# Patient Record
Sex: MALE | Race: BLACK OR AFRICAN AMERICAN | Employment: FULL TIME | URBAN - METROPOLITAN AREA
[De-identification: names, ages, dates, MRNs, and addresses within clinical notes are randomized per-mention and may not be internally consistent; named-entity substitution may affect disease eponyms.]

---

## 2017-01-30 ENCOUNTER — APPOINTMENT (OUTPATIENT)
Dept: CT IMAGING | Age: 52
End: 2017-01-30
Attending: NURSE PRACTITIONER
Payer: COMMERCIAL

## 2017-01-30 ENCOUNTER — HOSPITAL ENCOUNTER (OUTPATIENT)
Age: 52
Discharge: HOME | End: 2017-01-30
Payer: COMMERCIAL

## 2017-01-30 VITALS
SYSTOLIC BLOOD PRESSURE: 136 MMHG | HEART RATE: 68 BPM | OXYGEN SATURATION: 96 % | DIASTOLIC BLOOD PRESSURE: 75 MMHG | RESPIRATION RATE: 16 BRPM | TEMPERATURE: 99 F

## 2017-01-30 DIAGNOSIS — N40.0 ENLARGED PROSTATE: ICD-10-CM

## 2017-01-30 DIAGNOSIS — K76.89 HEPATIC CYST: ICD-10-CM

## 2017-01-30 DIAGNOSIS — E87.6 HYPOKALEMIA: Primary | ICD-10-CM

## 2017-01-30 DIAGNOSIS — K52.9 GASTROENTERITIS: ICD-10-CM

## 2017-01-30 LAB
#LYMPHOCYTE IC: 3.2 X10ˆ3/UL (ref 0.9–3.2)
#MXD IC: 0.9 X10ˆ3/UL (ref 0.1–1)
#NEUTROPHIL IC: 5.9 X10ˆ3/UL (ref 1.3–6.7)
CREAT SERPL-MCNC: 1.3 MG/DL (ref 0.7–1.2)
GLUCOSE BLD-MCNC: 99 MG/DL (ref 65–99)
HCT IC: 38.2 % (ref 37–54)
HGB IC: 13 G/DL (ref 13–17)
ISTAT BLOOD GAS TCO2: 25 MMOL/L (ref 22–32)
ISTAT BUN: 8 MG/DL (ref 8–20)
ISTAT CHLORIDE: 94 MMOL/L (ref 101–111)
ISTAT HEMATOCRIT: 40 % (ref 37–54)
ISTAT IONIZED CALCIUM: 1.18 MMOL/L (ref 1.12–1.32)
ISTAT POTASSIUM: 3.3 MMOL/L (ref 3.6–5.1)
ISTAT SODIUM: 135 MMOL/L (ref 136–144)
MCH IC: 30.1 PG (ref 27–33.2)
MCHC IC: 34 G/DL (ref 31–37)
MCV IC: 88.4 FL (ref 80–99)
PLT IC: 475 X10ˆ3/UL (ref 150–450)
RBC IC: 4.32 X10ˆ6/UL (ref 4.3–5.7)
WBC IC: 10 X10ˆ3/UL (ref 4–13)

## 2017-01-30 PROCEDURE — 99204 OFFICE O/P NEW MOD 45 MIN: CPT

## 2017-01-30 PROCEDURE — 74177 CT ABD & PELVIS W/CONTRAST: CPT

## 2017-01-30 PROCEDURE — 81002 URINALYSIS NONAUTO W/O SCOPE: CPT | Performed by: NURSE PRACTITIONER

## 2017-01-30 PROCEDURE — 96360 HYDRATION IV INFUSION INIT: CPT

## 2017-01-30 PROCEDURE — 85025 COMPLETE CBC W/AUTO DIFF WBC: CPT | Performed by: NURSE PRACTITIONER

## 2017-01-30 PROCEDURE — 80047 BASIC METABLC PNL IONIZED CA: CPT

## 2017-01-30 PROCEDURE — 99205 OFFICE O/P NEW HI 60 MIN: CPT

## 2017-01-30 RX ORDER — POTASSIUM CHLORIDE 20 MEQ/1
20 TABLET, EXTENDED RELEASE ORAL ONCE
Status: COMPLETED | OUTPATIENT
Start: 2017-01-30 | End: 2017-01-30

## 2017-01-30 RX ORDER — ONDANSETRON 4 MG/1
4 TABLET, ORALLY DISINTEGRATING ORAL EVERY 4 HOURS PRN
Qty: 10 TABLET | Refills: 0 | Status: SHIPPED | OUTPATIENT
Start: 2017-01-30 | End: 2020-02-10

## 2017-01-30 RX ORDER — SODIUM CHLORIDE 9 MG/ML
1000 INJECTION, SOLUTION INTRAVENOUS ONCE
Status: COMPLETED | OUTPATIENT
Start: 2017-01-30 | End: 2017-01-30

## 2017-01-30 RX ORDER — AMLODIPINE BESYLATE 10 MG/1
10 TABLET ORAL DAILY
COMMUNITY

## 2017-01-30 RX ORDER — POTASSIUM CHLORIDE 750 MG/1
10 TABLET, EXTENDED RELEASE ORAL 2 TIMES DAILY
Qty: 30 TABLET | Refills: 0 | Status: SHIPPED | OUTPATIENT
Start: 2017-01-30 | End: 2020-02-10

## 2017-01-30 RX ORDER — ENALAPRIL MALEATE 10 MG/1
10 TABLET ORAL DAILY
COMMUNITY

## 2017-01-30 RX ORDER — TERAZOSIN 2 MG/1
2 CAPSULE ORAL NIGHTLY
COMMUNITY

## 2017-01-30 RX ORDER — FLUTICASONE PROPIONATE 50 MCG
SPRAY, SUSPENSION (ML) NASAL DAILY
COMMUNITY
End: 2019-12-18

## 2017-01-30 NOTE — ED NOTES
Pt back from CT with Leanne Avalos given without complications, Changed back into clothes from home, IVF infusing, denies any needs, call light within reach, pain 4/10.

## 2017-01-30 NOTE — ED INITIAL ASSESSMENT (HPI)
X 1.6 WKS Pt c/o nausea, and diarrhea (3-4 times a day \"everytime I eat). Denies vomiting and fevers. x3 days Pt c/o decreased appitite. Pt does report taking Cephalaxin for \"vericose veins\".   Pt has it for PRN issues and has taken it for the past 3

## 2017-01-31 NOTE — ED PROVIDER NOTES
Patient Seen in: [address] [facility]    History   Patient presents with:  Nausea  Diarrhea    Stated Complaint: SINUS PAIN    HPI  14-year-old male who presents to the IC with complaints of nausea and diarrhea for the past 3 weeks.   Patient s Review of Systems   Constitutional: Negative for fever and chills. HENT: Negative. Eyes: Negative. Respiratory: Negative. Cardiovascular: Negative. Gastrointestinal: Positive for nausea and diarrhea.  Negative for abdominal pain, con hepatosplenomegaly. There is no tenderness. There is no rigidity, no rebound, no guarding, no CVA tenderness, no tenderness at McBurney's point and negative Pearson's sign. Neurological: He is alert and oriented to person, place, and time.    Skin: Skin is enhancing or suspicious hepatic lesions are noted. BILIARY:  Normal.  No visible dilatation or calcification. PANCREAS:  Normal.  No lesion, fluid collection, ductal dilatation, or atrophy. SPLEEN:  Normal.  No enlargement or focal lesion.   KIDNEYS:  Nor cyst    Disposition:  Discharge    Follow-up:  Your family MD    In 1 week  As needed, If symptoms worsen      Medications Prescribed:  Discharge Medication List as of 1/30/2017  3:48 PM    START taking these medications    Potassium Chloride ER 10 MEQ Ora

## 2017-02-07 ENCOUNTER — LAB ENCOUNTER (OUTPATIENT)
Dept: LAB | Age: 52
End: 2017-02-07
Attending: FAMILY MEDICINE
Payer: COMMERCIAL

## 2017-02-07 DIAGNOSIS — R19.7 DIARRHEA: Primary | ICD-10-CM

## 2017-02-07 PROCEDURE — 87046 STOOL CULTR AEROBIC BACT EA: CPT

## 2017-02-07 PROCEDURE — 87269 GIARDIA AG IF: CPT

## 2017-02-07 PROCEDURE — 87427 SHIGA-LIKE TOXIN AG IA: CPT

## 2017-02-07 PROCEDURE — 87015 SPECIMEN INFECT AGNT CONCNTJ: CPT

## 2017-02-07 PROCEDURE — 87045 FECES CULTURE AEROBIC BACT: CPT

## 2017-02-07 PROCEDURE — 87209 SMEAR COMPLEX STAIN: CPT

## 2017-02-07 PROCEDURE — 87493 C DIFF AMPLIFIED PROBE: CPT

## 2017-02-07 PROCEDURE — 87177 OVA AND PARASITES SMEARS: CPT

## 2017-02-10 LAB
OVA AND PARASITE, TRICHROME STAIN: NEGATIVE
OVA AND PARASITE, WET MOUNT: NEGATIVE

## 2017-07-14 ENCOUNTER — CHARTING TRANS (OUTPATIENT)
Dept: OTHER | Age: 52
End: 2017-07-14

## 2017-07-20 ENCOUNTER — CHARTING TRANS (OUTPATIENT)
Dept: OTHER | Age: 52
End: 2017-07-20

## 2017-07-21 ENCOUNTER — CHARTING TRANS (OUTPATIENT)
Dept: OTHER | Age: 52
End: 2017-07-21

## 2017-08-03 ENCOUNTER — CHARTING TRANS (OUTPATIENT)
Dept: OTHER | Age: 52
End: 2017-08-03

## 2017-09-07 ENCOUNTER — CHARTING TRANS (OUTPATIENT)
Dept: OTHER | Age: 52
End: 2017-09-07

## 2017-12-08 ENCOUNTER — CHARTING TRANS (OUTPATIENT)
Dept: OTHER | Age: 52
End: 2017-12-08

## 2017-12-14 ENCOUNTER — CHARTING TRANS (OUTPATIENT)
Dept: OTHER | Age: 52
End: 2017-12-14

## 2018-01-10 ENCOUNTER — CHARTING TRANS (OUTPATIENT)
Dept: OTHER | Age: 53
End: 2018-01-10

## 2018-01-11 ENCOUNTER — CHARTING TRANS (OUTPATIENT)
Dept: OTHER | Age: 53
End: 2018-01-11

## 2018-01-19 ENCOUNTER — CHARTING TRANS (OUTPATIENT)
Dept: OTHER | Age: 53
End: 2018-01-19

## 2018-01-25 ENCOUNTER — CHARTING TRANS (OUTPATIENT)
Dept: OTHER | Age: 53
End: 2018-01-25

## 2018-02-20 ENCOUNTER — CHARTING TRANS (OUTPATIENT)
Dept: OTHER | Age: 53
End: 2018-02-20

## 2018-03-01 ENCOUNTER — CHARTING TRANS (OUTPATIENT)
Dept: OTHER | Age: 53
End: 2018-03-01

## 2018-04-06 ENCOUNTER — CHARTING TRANS (OUTPATIENT)
Dept: OTHER | Age: 53
End: 2018-04-06

## 2018-08-02 ENCOUNTER — CHARTING TRANS (OUTPATIENT)
Dept: OTHER | Age: 53
End: 2018-08-02

## 2018-11-01 ENCOUNTER — CHARTING TRANS (OUTPATIENT)
Dept: OTHER | Age: 53
End: 2018-11-01

## 2018-12-22 ENCOUNTER — HOSPITAL ENCOUNTER (OUTPATIENT)
Dept: GENERAL RADIOLOGY | Age: 53
Discharge: HOME | End: 2018-12-22
Attending: CHIROPRACTOR
Payer: COMMERCIAL

## 2018-12-22 DIAGNOSIS — G35 HX OF MULTIPLE SCLEROSIS (HCC): ICD-10-CM

## 2018-12-22 DIAGNOSIS — S73.192A OTHER SPRAIN OF LEFT HIP, INITIAL ENCOUNTER: ICD-10-CM

## 2018-12-22 PROCEDURE — 73502 X-RAY EXAM HIP UNI 2-3 VIEWS: CPT | Performed by: CHIROPRACTOR

## 2019-01-10 ENCOUNTER — OFFICE VISIT (OUTPATIENT)
Dept: AUDIOLOGY | Age: 54
End: 2019-01-10

## 2019-01-10 DIAGNOSIS — H90.0 CONDUCTIVE HEARING LOSS, BILATERAL: Primary | ICD-10-CM

## 2019-01-10 PROCEDURE — X1094 NO CHARGE VISIT: HCPCS | Performed by: AUDIOLOGIST

## 2019-02-02 ENCOUNTER — APPOINTMENT (OUTPATIENT)
Dept: GENERAL RADIOLOGY | Age: 54
End: 2019-02-02
Attending: FAMILY MEDICINE
Payer: COMMERCIAL

## 2019-02-02 ENCOUNTER — HOSPITAL ENCOUNTER (OUTPATIENT)
Age: 54
Discharge: HOME | End: 2019-02-02
Attending: FAMILY MEDICINE
Payer: COMMERCIAL

## 2019-02-02 VITALS
RESPIRATION RATE: 18 BRPM | TEMPERATURE: 98 F | SYSTOLIC BLOOD PRESSURE: 147 MMHG | DIASTOLIC BLOOD PRESSURE: 82 MMHG | BODY MASS INDEX: 33.04 KG/M2 | OXYGEN SATURATION: 97 % | WEIGHT: 236 LBS | HEIGHT: 71 IN | HEART RATE: 68 BPM

## 2019-02-02 DIAGNOSIS — S20.212A RIB CONTUSION, LEFT, INITIAL ENCOUNTER: Primary | ICD-10-CM

## 2019-02-02 PROCEDURE — 71101 X-RAY EXAM UNILAT RIBS/CHEST: CPT | Performed by: FAMILY MEDICINE

## 2019-02-02 PROCEDURE — 99213 OFFICE O/P EST LOW 20 MIN: CPT

## 2019-02-02 RX ORDER — TADALAFIL 5 MG/1
5 TABLET ORAL DAILY
COMMUNITY

## 2019-02-02 RX ORDER — PAROXETINE HYDROCHLORIDE 25 MG/1
25 TABLET, FILM COATED, EXTENDED RELEASE ORAL
Refills: 0 | COMMUNITY
Start: 2018-11-09

## 2019-02-02 NOTE — ED INITIAL ASSESSMENT (HPI)
Patient c/o pain to left rib pain for about 10- 14 days. Unsure of cause, but thinks it may be related to bumping into a rail on an elevator when getting off and fell into it. Pain has not improved.  Patient has been taking 800mg of ibuprofen that does help
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
## 2019-02-21 RX ORDER — AMLODIPINE BESYLATE 10 MG/1
TABLET ORAL
COMMUNITY

## 2019-02-21 RX ORDER — FLUTICASONE PROPIONATE 50 MCG
SPRAY, SUSPENSION (ML) NASAL
COMMUNITY

## 2019-02-21 RX ORDER — PAROXETINE HYDROCHLORIDE HEMIHYDRATE 25 MG/1
TABLET, FILM COATED, EXTENDED RELEASE ORAL
COMMUNITY

## 2019-02-21 RX ORDER — ENALAPRIL MALEATE AND HYDROCHLOROTHIAZIDE 10; 25 MG/1; MG/1
TABLET ORAL
COMMUNITY

## 2019-04-26 ENCOUNTER — OFFICE VISIT (OUTPATIENT)
Dept: AUDIOLOGY | Age: 54
End: 2019-04-26

## 2019-04-26 DIAGNOSIS — H90.0 CONDUCTIVE HEARING LOSS, BILATERAL: Primary | ICD-10-CM

## 2019-04-26 PROCEDURE — X1094 NO CHARGE VISIT: HCPCS | Performed by: AUDIOLOGIST

## 2019-06-21 ENCOUNTER — HOSPITAL ENCOUNTER (OUTPATIENT)
Age: 54
Discharge: HOME | End: 2019-06-21
Attending: FAMILY MEDICINE
Payer: COMMERCIAL

## 2019-06-21 VITALS
RESPIRATION RATE: 16 BRPM | WEIGHT: 260 LBS | TEMPERATURE: 98 F | SYSTOLIC BLOOD PRESSURE: 134 MMHG | DIASTOLIC BLOOD PRESSURE: 73 MMHG | HEART RATE: 75 BPM | OXYGEN SATURATION: 97 % | BODY MASS INDEX: 36 KG/M2

## 2019-06-21 DIAGNOSIS — J01.10 ACUTE FRONTAL SINUSITIS, RECURRENCE NOT SPECIFIED: Primary | ICD-10-CM

## 2019-06-21 PROCEDURE — 99213 OFFICE O/P EST LOW 20 MIN: CPT

## 2019-06-21 PROCEDURE — 99214 OFFICE O/P EST MOD 30 MIN: CPT

## 2019-06-21 RX ORDER — AMOXICILLIN AND CLAVULANATE POTASSIUM 875; 125 MG/1; MG/1
1 TABLET, FILM COATED ORAL 2 TIMES DAILY
Qty: 28 TABLET | Refills: 0 | Status: SHIPPED | OUTPATIENT
Start: 2019-06-21 | End: 2019-07-05

## 2019-06-21 RX ORDER — METHYLPREDNISOLONE 4 MG/1
TABLET ORAL
Qty: 21 TABLET | Refills: 0 | Status: SHIPPED | OUTPATIENT
Start: 2019-06-21 | End: 2019-12-18

## 2019-06-21 NOTE — ED PROVIDER NOTES
Patient Seen in: [address] [facility]    History   Patient presents with:  Sinus Problem  Cough/URI    Stated Complaint: coughing sinus drainage fatigue     HPI    *78-year-old male presents to the immediate care today with chief complaint of src Temporal   SpO2 97 %   O2 Device None (Room air)       Current:/73   Pulse 75   Temp 97.7 °F (36.5 °C) (Temporal)   Resp 16   Wt 117.9 kg   SpO2 97%   BMI 36.26 kg/m²         Physical Exam    General appearance: alert, appears stated age and coop [id number]  [phone]    In 1 week  For re-check        Medications Prescribed:  Discharge Medication List as of 6/21/2019  3:33 PM    START taking these medications    Amoxicillin-Pot Clavulanate (AUGMENTIN) 875-125 MG Oral Tab  Take 1 tablet by mouth 2 (tw
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
## 2019-09-20 ENCOUNTER — OFFICE VISIT (OUTPATIENT)
Dept: AUDIOLOGY | Age: 54
End: 2019-09-20

## 2019-09-20 DIAGNOSIS — H90.3 SENSORINEURAL HEARING LOSS (SNHL) OF BOTH EARS: Primary | ICD-10-CM

## 2019-09-20 PROCEDURE — X1094 NO CHARGE VISIT: HCPCS | Performed by: AUDIOLOGIST

## 2019-12-18 ENCOUNTER — APPOINTMENT (OUTPATIENT)
Dept: GENERAL RADIOLOGY | Age: 54
End: 2019-12-18
Attending: NURSE PRACTITIONER
Payer: COMMERCIAL

## 2019-12-18 ENCOUNTER — HOSPITAL ENCOUNTER (OUTPATIENT)
Age: 54
Discharge: HOME | End: 2019-12-18
Payer: COMMERCIAL

## 2019-12-18 VITALS
OXYGEN SATURATION: 97 % | DIASTOLIC BLOOD PRESSURE: 77 MMHG | SYSTOLIC BLOOD PRESSURE: 144 MMHG | TEMPERATURE: 98 F | HEART RATE: 82 BPM | WEIGHT: 250 LBS | BODY MASS INDEX: 35 KG/M2 | RESPIRATION RATE: 16 BRPM

## 2019-12-18 DIAGNOSIS — J32.9 CHRONIC SINUSITIS, UNSPECIFIED LOCATION: ICD-10-CM

## 2019-12-18 DIAGNOSIS — J40 BRONCHITIS: Primary | ICD-10-CM

## 2019-12-18 PROCEDURE — 71046 X-RAY EXAM CHEST 2 VIEWS: CPT | Performed by: NURSE PRACTITIONER

## 2019-12-18 PROCEDURE — 99214 OFFICE O/P EST MOD 30 MIN: CPT

## 2019-12-18 PROCEDURE — 94640 AIRWAY INHALATION TREATMENT: CPT

## 2019-12-18 RX ORDER — AMOXICILLIN AND CLAVULANATE POTASSIUM 875; 125 MG/1; MG/1
1 TABLET, FILM COATED ORAL 2 TIMES DAILY
Qty: 14 TABLET | Refills: 0 | Status: SHIPPED | OUTPATIENT
Start: 2019-12-18 | End: 2019-12-25

## 2019-12-18 RX ORDER — GUAIFENESIN AND CODEINE PHOSPHATE 100; 10 MG/5ML; MG/5ML
5 SOLUTION ORAL EVERY 8 HOURS PRN
Qty: 118 ML | Refills: 0 | Status: SHIPPED | OUTPATIENT
Start: 2019-12-18 | End: 2020-01-01

## 2019-12-18 RX ORDER — ZOLPIDEM TARTRATE 10 MG/1
10 TABLET ORAL NIGHTLY PRN
COMMUNITY

## 2019-12-18 RX ORDER — BENZONATATE 100 MG/1
100 CAPSULE ORAL 3 TIMES DAILY PRN
Qty: 30 CAPSULE | Refills: 0 | Status: SHIPPED | OUTPATIENT
Start: 2019-12-18 | End: 2020-01-17

## 2019-12-18 RX ORDER — ALBUTEROL SULFATE 90 UG/1
2 AEROSOL, METERED RESPIRATORY (INHALATION) EVERY 4 HOURS PRN
Qty: 1 INHALER | Refills: 0 | Status: SHIPPED | OUTPATIENT
Start: 2019-12-18 | End: 2020-01-17

## 2019-12-18 RX ORDER — IPRATROPIUM BROMIDE AND ALBUTEROL SULFATE 2.5; .5 MG/3ML; MG/3ML
3 SOLUTION RESPIRATORY (INHALATION) ONCE
Status: COMPLETED | OUTPATIENT
Start: 2019-12-18 | End: 2019-12-18

## 2019-12-18 RX ORDER — FLUTICASONE PROPIONATE 50 MCG
2 SPRAY, SUSPENSION (ML) NASAL DAILY
Qty: 16 G | Refills: 0 | Status: SHIPPED | OUTPATIENT
Start: 2019-12-18 | End: 2020-01-17
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
## 2020-02-10 ENCOUNTER — APPOINTMENT (OUTPATIENT)
Dept: GENERAL RADIOLOGY | Age: 55
End: 2020-02-10
Attending: EMERGENCY MEDICINE
Payer: COMMERCIAL

## 2020-02-10 ENCOUNTER — HOSPITAL ENCOUNTER (OUTPATIENT)
Age: 55
Discharge: HOME | End: 2020-02-10
Attending: EMERGENCY MEDICINE
Payer: COMMERCIAL

## 2020-02-10 VITALS
OXYGEN SATURATION: 98 % | TEMPERATURE: 98 F | SYSTOLIC BLOOD PRESSURE: 159 MMHG | RESPIRATION RATE: 16 BRPM | HEART RATE: 79 BPM | DIASTOLIC BLOOD PRESSURE: 88 MMHG

## 2020-02-10 DIAGNOSIS — S16.1XXA STRAIN OF NECK MUSCLE, INITIAL ENCOUNTER: Primary | ICD-10-CM

## 2020-02-10 PROCEDURE — 72050 X-RAY EXAM NECK SPINE 4/5VWS: CPT | Performed by: EMERGENCY MEDICINE

## 2020-02-10 PROCEDURE — 99213 OFFICE O/P EST LOW 20 MIN: CPT

## 2020-02-10 PROCEDURE — 99214 OFFICE O/P EST MOD 30 MIN: CPT

## 2020-02-10 RX ORDER — IBUPROFEN 800 MG/1
800 TABLET ORAL EVERY 6 HOURS PRN
COMMUNITY

## 2020-02-10 RX ORDER — CYCLOBENZAPRINE HCL 10 MG
10 TABLET ORAL 3 TIMES DAILY PRN
Qty: 20 TABLET | Refills: 0 | Status: SHIPPED | OUTPATIENT
Start: 2020-02-10 | End: 2020-02-10

## 2020-02-10 RX ORDER — CYCLOBENZAPRINE HCL 10 MG
10 TABLET ORAL 3 TIMES DAILY PRN
Qty: 20 TABLET | Refills: 0 | Status: SHIPPED | OUTPATIENT
Start: 2020-02-10 | End: 2020-02-17
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
## 2020-05-21 ENCOUNTER — TELEPHONE (OUTPATIENT)
Dept: AUDIOLOGY | Age: 55
End: 2020-05-21

## 2020-05-21 ENCOUNTER — APPOINTMENT (OUTPATIENT)
Dept: AUDIOLOGY | Age: 55
End: 2020-05-21

## 2020-06-05 ENCOUNTER — APPOINTMENT (OUTPATIENT)
Dept: AUDIOLOGY | Age: 55
End: 2020-06-05

## 2020-06-17 ENCOUNTER — TELEPHONE (OUTPATIENT)
Dept: AUDIOLOGY | Age: 55
End: 2020-06-17

## 2020-06-18 ENCOUNTER — OFFICE VISIT (OUTPATIENT)
Dept: AUDIOLOGY | Age: 55
End: 2020-06-18

## 2020-06-18 DIAGNOSIS — H90.0 CONDUCTIVE HEARING LOSS, BILATERAL: Primary | ICD-10-CM

## 2020-06-18 PROCEDURE — X1094 NO CHARGE VISIT: HCPCS | Performed by: AUDIOLOGIST

## 2020-06-23 ENCOUNTER — TELEPHONE (OUTPATIENT)
Dept: AUDIOLOGY | Age: 55
End: 2020-06-23

## 2020-06-26 ENCOUNTER — OFFICE VISIT (OUTPATIENT)
Dept: AUDIOLOGY | Age: 55
End: 2020-06-26

## 2020-06-26 DIAGNOSIS — H90.0 CONDUCTIVE HEARING LOSS, BILATERAL: Primary | ICD-10-CM

## 2020-06-26 PROCEDURE — X1094 NO CHARGE VISIT: HCPCS | Performed by: AUDIOLOGIST

## 2020-07-24 ENCOUNTER — OFFICE VISIT (OUTPATIENT)
Dept: AUDIOLOGY | Age: 55
End: 2020-07-24

## 2020-07-24 DIAGNOSIS — H90.0 CONDUCTIVE HEARING LOSS, BILATERAL: Primary | ICD-10-CM

## 2020-07-24 PROCEDURE — 92557 COMPREHENSIVE HEARING TEST: CPT | Performed by: AUDIOLOGIST

## 2020-07-31 ENCOUNTER — TELEPHONE (OUTPATIENT)
Dept: AUDIOLOGY | Age: 55
End: 2020-07-31

## 2020-08-06 ENCOUNTER — OFFICE VISIT (OUTPATIENT)
Dept: AUDIOLOGY | Age: 55
End: 2020-08-06

## 2020-08-06 DIAGNOSIS — H90.0 CONDUCTIVE HEARING LOSS, BILATERAL: Primary | ICD-10-CM

## 2020-08-06 PROCEDURE — S9999 SALES TAX: HCPCS | Performed by: AUDIOLOGIST

## 2020-08-06 PROCEDURE — X1094 NO CHARGE VISIT: HCPCS | Performed by: AUDIOLOGIST

## 2020-08-06 PROCEDURE — 92591 HEARING AID EXAM, BOTH EARS: CPT | Performed by: AUDIOLOGIST

## 2020-08-06 PROCEDURE — V5261 HEARING AID, DIGIT, BIN, BTE: HCPCS | Performed by: AUDIOLOGIST

## 2021-02-21 ENCOUNTER — HOSPITAL ENCOUNTER (OUTPATIENT)
Age: 56
Discharge: HOME | End: 2021-02-21
Payer: COMMERCIAL

## 2021-02-21 ENCOUNTER — APPOINTMENT (OUTPATIENT)
Dept: GENERAL RADIOLOGY | Age: 56
End: 2021-02-21
Attending: NURSE PRACTITIONER
Payer: COMMERCIAL

## 2021-02-21 VITALS
SYSTOLIC BLOOD PRESSURE: 142 MMHG | TEMPERATURE: 98 F | RESPIRATION RATE: 20 BRPM | HEART RATE: 68 BPM | OXYGEN SATURATION: 98 % | DIASTOLIC BLOOD PRESSURE: 82 MMHG
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
## 2021-02-21 DIAGNOSIS — S86.911A KNEE STRAIN, RIGHT, INITIAL ENCOUNTER: ICD-10-CM

## 2021-02-21 DIAGNOSIS — M25.561 KNEE PAIN, RIGHT: Primary | ICD-10-CM

## 2021-02-21 PROCEDURE — 73560 X-RAY EXAM OF KNEE 1 OR 2: CPT | Performed by: NURSE PRACTITIONER

## 2021-02-21 PROCEDURE — 99213 OFFICE O/P EST LOW 20 MIN: CPT | Performed by: NURSE PRACTITIONER

## 2021-02-21 PROCEDURE — L1830 KO IMMOB CANVAS LONG PRE OTS: HCPCS | Performed by: NURSE PRACTITIONER

## 2021-02-21 PROCEDURE — A6449 LT COMPRES BAND >=3" <5"/YD: HCPCS | Performed by: NURSE PRACTITIONER

## 2021-02-21 RX ORDER — HYDROCODONE BITARTRATE AND ACETAMINOPHEN 5; 325 MG/1; MG/1
1-2 TABLET ORAL EVERY 6 HOURS PRN
Qty: 15 TABLET | Refills: 0 | Status: SHIPPED | OUTPATIENT
Start: 2021-02-21 | End: 2021-02-28

## 2021-02-21 NOTE — ED NOTES
Applied knee immobilizer and discussed use of ace wrap. Patient wants to wear his jeans home and will apply them at home. Edi SIEGEL aware.

## 2021-02-21 NOTE — ED INITIAL ASSESSMENT (HPI)
Patient fell down after tripping up the stairs in December. He is feeling better except for this right knee. It continues to be painful and swollen. He was not seen at the time of the fall.

## 2021-02-21 NOTE — ED PROVIDER NOTES
Patient Seen in: [facility]      History   Patient presents with:  Leg or Foot Injury    Stated Complaint: Knee Swelling/pain    HPI/Subjective:   65-year-old male presents the immediate care for right knee pain.   Patient states that he fell i 68   Temp 97.6 °F (36.4 °C) (Temporal)   Resp 20   SpO2 98%         Physical Exam  Vitals signs and nursing note reviewed. Constitutional:       General: He is not in acute distress. Appearance: Normal appearance. He is not ill-appearing.    HENT: without any difficulty.                            Disposition and Plan     Clinical Impression:  Knee pain, right  (primary encounter diagnosis)  Fall  Knee strain, right, initial encounter    Disposition:  Discharge  2/21/2021  1:16 pm    Follow-up:  Roselyn Eng

## 2021-09-24 ENCOUNTER — TELEPHONE (OUTPATIENT)
Dept: AUDIOLOGY | Age: 56
End: 2021-09-24

## 2021-09-30 ENCOUNTER — OFFICE VISIT (OUTPATIENT)
Dept: AUDIOLOGY | Age: 56
End: 2021-09-30

## 2021-09-30 DIAGNOSIS — H90.0 CONDUCTIVE HEARING LOSS, BILATERAL: Primary | ICD-10-CM

## 2021-09-30 PROCEDURE — X1094 NO CHARGE VISIT: HCPCS | Performed by: AUDIOLOGIST

## (undated) NOTE — LETTER
Date & Time: 12/18/2019, 2:17 PM  Patient: Jayy Mason. Encounter Provider(s):    MAGALY Carey       To Whom It May Concern:    Georgina De Los Santos was seen and treated in our department on 12/18/2019.  He should not return to work until 12/26/2019

## (undated) NOTE — ED AVS SNAPSHOT
[facility] [place] [address] [address]    Phone:  [phone]    Fax:  [phone]           Rose Chapin.    MRN: [id number]    Department:  [facility] [place]   Date of Visit:  1/30/2017           Adam Chapin - [address] AT [address] [place] [address], [phone], [phone]  [address], [facility] [address]     Phone:  [phone]    - ondansetron 4 MG Tbdp  - Potassium Chloride ER 10 MEQ Tbcr              Discharge Instructions       Follow- receive this, we would really appreciate it if you could take the time to complete it. Thank you! You were examined and treated today on an urgent basis only. This was not a substitute for ongoing medical care.  Often, one Immediate Care visit does no Cande Carr [address] ([address]) [address]  [place] (Darcus Callas) [address] [facility] Chloe ([facility]) [phone] [facility]. ([address]) 301- the pubic symphysis with non-ionic intravenous contrast material. Post contrast coronal MIP imaging was performed. Dose reduction techniques were used.  Dose information is   transmitted to the [facility] ([address] [place] [address] of Radiology) Meenu Rizzo [facility] Radiology D Ver[facility] will allow you to access patient instructions from your recent visit,  view other health information, and more. To sign up or find more information, go to [email or web address] and click on the Sign Up Now link in the [employer] box.      Enter

## (undated) NOTE — LETTER
[facility] [place]  [address] [address]  Dept: [phone]  Dept Fax: [phone]      January 30, 2017    Patient: Norberto Harding.    Date of Visit: 1/30/2017       To Whom It May Concern:    Gely Daniel was seen and justin

## (undated) NOTE — LETTER
Date & Time: 2/10/2020, 8:27 PM  Patient: Nabil Maciel. Encounter Provider(s):    Dorie Schulz MD       To Whom It May Concern:    Pascale Nelson was seen and treated in our department on 2/10/2020.  He should not return to work until February 13